# Patient Record
Sex: MALE | Race: WHITE
[De-identification: names, ages, dates, MRNs, and addresses within clinical notes are randomized per-mention and may not be internally consistent; named-entity substitution may affect disease eponyms.]

---

## 2021-04-09 ENCOUNTER — HOSPITAL ENCOUNTER (EMERGENCY)
Dept: HOSPITAL 8 - ED | Age: 51
Discharge: HOME | End: 2021-04-09
Payer: SELF-PAY

## 2021-04-09 VITALS — DIASTOLIC BLOOD PRESSURE: 73 MMHG | SYSTOLIC BLOOD PRESSURE: 131 MMHG

## 2021-04-09 VITALS — BODY MASS INDEX: 38.82 KG/M2 | HEIGHT: 72 IN | WEIGHT: 286.6 LBS
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
## 2021-04-09 DIAGNOSIS — R51.9: ICD-10-CM
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
## 2021-04-09 DIAGNOSIS — R94.31: ICD-10-CM

## 2021-04-09 DIAGNOSIS — F10.229: ICD-10-CM
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
## 2021-04-09 DIAGNOSIS — S00.31XA: Primary | ICD-10-CM
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
## 2021-04-09 PROCEDURE — 93005 ELECTROCARDIOGRAM TRACING: CPT

## 2021-04-09 PROCEDURE — 70450 CT HEAD/BRAIN W/O DYE: CPT

## 2021-04-09 PROCEDURE — 99285 EMERGENCY DEPT VISIT HI MDM: CPT

## 2021-04-09 PROCEDURE — 70486 CT MAXILLOFACIAL W/O DYE: CPT

## 2021-04-09 NOTE — NUR
pt ambulated to and from restroom with a smooth and steady gait. Patient given 
discharge instructions and they have confirmed that they understand the 
instructions.  Patient ambulatory with steady gait. nad, denies additional 
questions or needs, no personal belongings left in room after dc. provided taxi 
voucher.

## 2021-04-09 NOTE — NUR
PT BIB EMS FROM [facility] AFTER DRINKING "A BIT TOO MUCH" AND FALLING. 
DEANA FOUND PT PASSED OUT FACE DOWN IN CASINO, ABRASION TO NOSE NOTED, PT 
STATES "I BLACKED OUT AND DONT REMEMBER ANYTHING" PT REPORTS "MY WALLET IS 
MISSING TOO". DENIES ANY MEDICAL HX. PT PLACED ON SPO2/BP MONITORING. BED IN 
LOWEST, RAILS ENGAGED, CALL LIGHT ON LAP, WCTM.

## 2021-04-18 ENCOUNTER — HOSPITAL ENCOUNTER (EMERGENCY)
Facility: MEDICAL CENTER | Age: 51
End: 2021-04-18
Attending: EMERGENCY MEDICINE

## 2021-04-18 ENCOUNTER — HOSPITAL ENCOUNTER (EMERGENCY)
Facility: MEDICAL CENTER | Age: 51
End: 2021-04-18

## 2021-04-18 ENCOUNTER — APPOINTMENT (OUTPATIENT)
Dept: RADIOLOGY | Facility: MEDICAL CENTER | Age: 51
End: 2021-04-18
Attending: EMERGENCY MEDICINE

## 2021-04-18 VITALS
OXYGEN SATURATION: 98 % | DIASTOLIC BLOOD PRESSURE: 88 MMHG | WEIGHT: 250 LBS | TEMPERATURE: 96.8 F | SYSTOLIC BLOOD PRESSURE: 157 MMHG | RESPIRATION RATE: 17 BRPM | HEART RATE: 80 BPM

## 2021-04-18 DIAGNOSIS — M47.812 OSTEOARTHRITIS OF CERVICAL SPINE, UNSPECIFIED SPINAL OSTEOARTHRITIS COMPLICATION STATUS: ICD-10-CM
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
## 2021-04-18 DIAGNOSIS — S00.83XA FOREHEAD CONTUSION, INITIAL ENCOUNTER: ICD-10-CM

## 2021-04-18 DIAGNOSIS — S09.90XA CLOSED HEAD INJURY, INITIAL ENCOUNTER: ICD-10-CM

## 2021-04-18 DIAGNOSIS — F10.920 ALCOHOLIC INTOXICATION WITHOUT COMPLICATION (HCC): ICD-10-CM

## 2021-04-18 PROCEDURE — 302449 STATCHG TRIAGE ONLY (STATISTIC)

## 2021-04-18 PROCEDURE — 99285 EMERGENCY DEPT VISIT HI MDM: CPT

## 2021-04-18 PROCEDURE — 72125 CT NECK SPINE W/O DYE: CPT

## 2021-04-18 PROCEDURE — 70450 CT HEAD/BRAIN W/O DYE: CPT

## 2021-04-18 PROCEDURE — 90471 IMMUNIZATION ADMIN: CPT

## 2021-04-18 PROCEDURE — 90715 TDAP VACCINE 7 YRS/> IM: CPT | Performed by: EMERGENCY MEDICINE

## 2021-04-18 PROCEDURE — 700111 HCHG RX REV CODE 636 W/ 250 OVERRIDE (IP): Performed by: EMERGENCY MEDICINE

## 2021-04-18 RX ADMIN — CLOSTRIDIUM TETANI TOXOID ANTIGEN (FORMALDEHYDE INACTIVATED), CORYNEBACTERIUM DIPHTHERIAE TOXOID ANTIGEN (FORMALDEHYDE INACTIVATED), BORDETELLA PERTUSSIS TOXOID ANTIGEN (GLUTARALDEHYDE INACTIVATED), BORDETELLA PERTUSSIS FILAMENTOUS HEMAGGLUTININ ANTIGEN (FORMALDEHYDE INACTIVATED), BORDETELLA PERTUSSIS PERTACTIN ANTIGEN, AND BORDETELLA PERTUSSIS FIMBRIAE 2/3 ANTIGEN 0.5 ML: 5; 2; 2.5; 5; 3; 5 INJECTION, SUSPENSION INTRAMUSCULAR at 06:36

## 2021-04-18 ASSESSMENT — ENCOUNTER SYMPTOMS
LOSS OF CONSCIOUSNESS: 1
FALLS: 1
HEADACHES: 1
FEVER: 0
VOMITING: 0
COUGH: 0

## 2021-04-18 ASSESSMENT — LIFESTYLE VARIABLES: SUBSTANCE_ABUSE: 1

## 2021-04-18 NOTE — ED PROVIDER NOTES
ED Provider Note    ED Provider Note    Primary care provider: No primary care provider on file.  Means of arrival: EMS  History obtained from: Patient, EMS  History limited by: intoxication    CHIEF COMPLAINT  Chief Complaint   Patient presents with   • T-5000 GLF     ETOH. Witnessed fall from standing at bar. Struck Head. +LOC. No thinners.        HPI  David Moreno is a 50 y.o. male who presents to the Emergency Department via EMS.  He was noted to be intoxicated at a bar.  He fell from standing.  EMS reported a positive LOC.  Patient struck his right forehead.  He denies being on any medication at all including denying any blood thinners.  He does not take aspirin.  He denies any past medical history.  Blood sugar was 60 per EMS.  GCS was 14.  His blood pressure was elevated.  His pulse was normal.  He was placed in a c-collar prior to arrival.  Patient denies any drug use.  He does admit to alcohol use.  He denies any other pain.  Reports that he was in his normal state of health prior to this episode.    REVIEW OF SYSTEMS  Review of Systems   Constitutional: Negative for fever.   Respiratory: Negative for cough.    Cardiovascular: Negative for chest pain.   Gastrointestinal: Negative for vomiting.   Musculoskeletal: Positive for falls.   Neurological: Positive for loss of consciousness and headaches.   Psychiatric/Behavioral: Positive for substance abuse.        Alcohol       PAST MEDICAL HISTORY   none reported    SURGICAL HISTORY  patient denies any surgical history    SOCIAL HISTORY  Social History     Tobacco Use   • Smoking status: Never Smoker   • Smokeless tobacco: Never Used   Substance Use Topics   • Alcohol use: Yes   • Drug use: Not Currently      Social History     Substance and Sexual Activity   Drug Use Not Currently       FAMILY HISTORY  History reviewed. No pertinent family history.    CURRENT MEDICATIONS  Home Medications    **Home medications have not yet been reviewed for this encounter**          ALLERGIES  No Known Allergies    PHYSICAL EXAM  VITAL SIGNS: /88   Pulse 80   Temp 36 °C (96.8 °F) (Tympanic)   Resp 17   Wt 113 kg (250 lb)   SpO2 98%   Vitals reviewed.  Constitutional: Patient is oriented to person, place, and time. Appears well-developed and well-nourished. Mild distress.    Head: Normocephalic.  There is a contusion with overlying abrasion to the right forehead.  Ears: Normal external ears bilaterally.   Mouth/Throat: Oropharynx is clear and moist.  No malocclusion.  Eyes: Conjunctivae are normal. Pupils are equal, round, and reactive to light. No subconjunctival hemorrhage.  EOMI.  Neck: C-collar in place.  Neck supple.  No crepitus.  No step-offs.  Cardiovascular: Normal rate, regular rhythm and normal heart sounds. Normal peripheral pulses.  Pulmonary/Chest: Effort normal and breath sounds normal. No respiratory distress, no wheezes, rhonchi, or rales. No chest wall tenderness.  Abdominal: Soft. Bowel sounds are normal. There is no tenderness. No rebound or guarding, or peritoneal signs.  Musculoskeletal: No edema and no tenderness. Normal ROM of bilateral upper and lower extremities  Neurological: No focal deficits. CN II through XII intact.  Skin: Skin is warm and dry. No erythema. No pallor.   Psychiatric: Patient has a normal mood and affect.     RADIOLOGY  CT-HEAD W/O   Final Result         1.  No acute intracranial abnormality.      CT-CSPINE WITHOUT PLUS RECONS   Final Result         1.  Multilevel degenerative changes of the cervical spine limit diagnostic sensitivity of this examination, otherwise no acute traumatic bony injury of the cervical spine is apparent. Examination is somewhat limited due to streak and scatter artifacts    and quantum mottle artifacts.   2.  Atherosclerosis        The radiologist's interpretation of all radiological studies have been reviewed by me.    COURSE & MEDICAL DECISION MAKING  Pertinent Labs & Imaging studies reviewed. (See chart  for details)    No prior encounters in our EMR.    4:24 AM - Patient seen and examined at bedside.  This is a previously reportedly healthy 50-year-old male who presents after a witnessed fall at a bar.  This is after patient was noted to be significantly intoxicated at the bar.  There was an LOC.  He denies blood thinners.  He presents in a c-collar.  Due to his intoxication, I have left the c-collar on.  Patient will need imaging of his head and I have included imaging of his C-spine.  He does not appear to have any extremity injuries.  He is hypertensive but otherwise has normal vital signs.  He is awake.  He can answer questions.  He does appear clinically intoxicated.  We will continue to monitor, until the patient is clinically sober.  He does not believe his tetanus is up-to-date and I have ordered tetanus immunization.    0610AM patient's reevaluated the bedside.  He is much more awake and alert.  Has been able to ambulate to and from the bathroom on his own with a steady gait.  He lives nearby.  He has normal vital signs.  He is calm and cooperative.  We discussed CT findings which show osteoarthritis but no other traumatic findings.  He is instructed on cessation of his alcohol use.      At this point, patient's stable for discharge to home.  He is well-appearing.    FINAL IMPRESSION  1. Fall from ground level    2. Closed head injury, initial encounter    3. Forehead contusion, initial encounter    4. Alcoholic intoxication without complication (HCC)    5. Osteoarthritis of cervical spine, unspecified spinal osteoarthritis complication status

## 2021-04-18 NOTE — ED NOTES
"Patient discharged home to self care. All questions answered and concerns alleviated. Patient ambulated out of the department with a steady gait. Patient refused food or drink stating, \"I have a feast waiting for me at home.\"     "

## 2021-04-18 NOTE — ED TRIAGE NOTES
David Moreno   50 y.o. male   Chief Complaint   Patient presents with   • T-5000 GLF     ETOH. Witnessed fall from standing at bar. Struck Head. +LOC. No thinners.

## 2022-07-25 ENCOUNTER — NON-PROVIDER VISIT (OUTPATIENT)
Dept: OCCUPATIONAL MEDICINE | Facility: CLINIC | Age: 52
End: 2022-07-25

## 2022-07-25 DIAGNOSIS — Z02.1 PRE-EMPLOYMENT DRUG SCREENING: ICD-10-CM

## 2022-07-25 LAB
AMP AMPHETAMINE: NORMAL
COC COCAINE: NORMAL
INT CON NEG: NORMAL
INT CON POS: NORMAL
MET METHAMPHETAMINES: NORMAL
OPI OPIATES: NORMAL
PCP PHENCYCLIDINE: NORMAL
POC DRUG COMMENT 753798-OCCUPATIONAL HEALTH: NORMAL
THC: NORMAL

## 2022-07-25 PROCEDURE — 80305 DRUG TEST PRSMV DIR OPT OBS: CPT | Performed by: NURSE PRACTITIONER
